# Patient Record
Sex: FEMALE | Race: WHITE | NOT HISPANIC OR LATINO
[De-identification: names, ages, dates, MRNs, and addresses within clinical notes are randomized per-mention and may not be internally consistent; named-entity substitution may affect disease eponyms.]

---

## 2019-12-27 ENCOUNTER — RESULT REVIEW (OUTPATIENT)
Age: 64
End: 2019-12-27

## 2021-10-06 ENCOUNTER — RESULT REVIEW (OUTPATIENT)
Age: 66
End: 2021-10-06

## 2023-06-14 ENCOUNTER — NON-APPOINTMENT (OUTPATIENT)
Age: 68
End: 2023-06-14

## 2023-06-15 ENCOUNTER — TRANSCRIPTION ENCOUNTER (OUTPATIENT)
Age: 68
End: 2023-06-15

## 2023-06-15 PROBLEM — Z00.00 ENCOUNTER FOR PREVENTIVE HEALTH EXAMINATION: Status: ACTIVE | Noted: 2023-06-15

## 2023-06-20 ENCOUNTER — APPOINTMENT (OUTPATIENT)
Dept: SURGERY | Facility: CLINIC | Age: 68
End: 2023-06-20
Payer: MEDICARE

## 2023-06-20 VITALS
SYSTOLIC BLOOD PRESSURE: 116 MMHG | HEIGHT: 65 IN | WEIGHT: 203 LBS | DIASTOLIC BLOOD PRESSURE: 72 MMHG | BODY MASS INDEX: 33.82 KG/M2

## 2023-06-20 DIAGNOSIS — Z78.9 OTHER SPECIFIED HEALTH STATUS: ICD-10-CM

## 2023-06-20 PROCEDURE — 99205 OFFICE O/P NEW HI 60 MIN: CPT

## 2023-06-20 NOTE — HISTORY OF PRESENT ILLNESS
Have You Had Botox Before?: has had botox
When Was Your Last Botox Treatment?: 08/2020
[FreeTextEntry1] : Oncology History:\par 1. 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 Patient went for screening mammogram on 06/06/23 and was found to have a new 1.7 cm spiculated mass. Stereotactic biopsy was preformed on 06/07/23 that came back as invasive carcinoma with mixed ductal and lobular features, grade 1, 7mm, ER (%), [place] (21-30%), Her 2 negative and ductal carcinoma in situ with associated calcifications intermediate grade. \par \par BREAST HISTORY: Denies breast symptoms. Last mammogram prior to current was in 2019. This was her first breast biopsy.\par \par NKDA, no latex allergy\par No AC or aspirin\par PMhx-HTN, HL, pre-diabetic \par \par \par Imaging: \par 06/06/23: [facility]: Screening Mammogram Bilateral: Density B, Impression- In the left breasts 3:00 axis posterior depth there is a new 1.7 cm low density spiculated mass. Features highly suggestive of primary breast cancer. No additional suspicious mammographic findings has developed in either breast. A few small scattered intramammary lymph nodes and benign calcifications bilaterally. No abnormal axillary adenopthy. BI-RADS0. \par \par 6/7/23-[facility]: Left limited US: Lesion not clearly identified. \par \par 06/07/23: [facility]: Stereotactic Core Biopsy Left Breast: Top hat clip: Impression- Invasive carcinoma with mixed ductal and lobular features, grade 1, 7mm, ductal carcinoma in situ associated calcifications present \par \par 6/15/23-[facility]- MRI Breast: Tophat associated susceptibility artifact is present in the slightly outer central left breast at posterior depth denoting the site of biopsy-proven malignancy (2/80). There is heterogeneous surrounding the mass enhancement measuring 1.7 x 1.6 x 1.2 cm (101/68). Abnormal enhancement extends up to 2.2 cm from the pectoralis major muscle and 1.2 cm from the lateral skin surface (7/68). The non-mass enhancement predominantly demonstrates medium initial and persistent enhancement kinetics. Mild\par enhancement along the biopsy tract extending to the lateral skin surface is likely postprocedural in nature.\par \par An intramammary lymph node is present in the outer central left breast at mid depth and mammographically stable dating back to 2019 (7/70). There is no left axillary lymphadenopathy.Right breast: An intramammary lymph node is present in the outer central right breast is mammographically stable dating back to 2019 (7/48). There is no suspicious enhancement. There is no right axillary lymphadenopathy.

## 2023-06-20 NOTE — PAST MEDICAL HISTORY
[Postmenopausal] : The patient is postmenopausal [Menarche Age ____] : age at menarche was [unfilled] [Total Preg ___] : G[unfilled] [Age At Live Birth ___] : Age at live birth: [unfilled] [de-identified] : 63 [FreeTextEntry6] : Denies [FreeTextEntry8] : 3 months  [FreeTextEntry7] : 6 months

## 2023-06-20 NOTE — PHYSICAL EXAM
[Normocephalic] : normocephalic [Supple] : supple [No Axillary Lymphadenopathy] : no left axillary lymphadenopathy [No Edema] : no edema [Atraumatic] : atraumatic [EOMI] : extra ocular movement intact [PERRL] : pupils equal, round and reactive to light [Sclera nonicteric] : sclera nonicteric [No Supraclavicular Adenopathy] : no supraclavicular adenopathy [No Cervical Adenopathy] : no cervical adenopathy [Examined in the supine and seated position] : examined in the supine and seated position [Symmetrical] : symmetrical [Bra Size: ___] : Bra Size: [unfilled] [Grade 2] : Ptosis Grade 2 [No dominant masses] : no dominant masses in right breast  [No dominant masses] : no dominant masses left breast [No Nipple Retraction] : no left nipple retraction [No Nipple Discharge] : no left nipple discharge [No Rashes] : no rashes [No Ulceration] : no ulceration [de-identified] : Post-biopsy changes

## 2023-06-20 NOTE — ASSESSMENT
[FreeTextEntry1] : 68 year old female with a newly diagnosed left breast cT1cN0 invasive carcinoma with mixed ductal and lobular features, grade 1, ER/SC +, HER2 negative.\par \par We discussed the patient’s new diagnosis of IDC and the pathophysiology of the disease process. We reviewed her work-up, imaging and pathology results to date including her hormone receptor status. We discussed the difference between systemic and local treatment and options for each type of control. We discussed the role of a breast surgeon, medical oncologist, and radiation oncologist in the patients’ treatment plan. We discussed the indications of surgery, anti-estrogen therapy, chemotherapy, radiation therapy.   \par \par We reviewed the surgical options of mastectomy versus breast conservation therapy (BCT).  We discussed the equivalent survival outcomes for patients treated with lumpectomy/radiation or mastectomy. As well as reconstructive options including no reconstruction (flat closure), implant-based, and autologous. She would be a BCT candidate and she favors this option. \par \par We discussed the need for sentinel lymph node biopsy, with intraoperative injection for sentinel lymph node mapping. Associated risks of lymphedema were also discussed, as well as lymphedema surveillance/prevention, and the use of SOZO. Will send patient for baseline SOZO at [facility], and she will follow with  PMR Dr. Nieves after surgery for lymphedema surveillance. \par \par We discussed the same day nature of the procedure, post-operative expectations/healing, need for localization prior to surgery, and PST/medical clearance.I will go over the final pathology results with her at her post-op visit 7-14 days later.\par \par Risks of the procedure were discussed including bleeding, infection, seroma, need for re-excision or completion axillary dissection, lymphedema, scarring, pain, asymmetry, or cosmetic deformity.\par \par Patient has a 5 week camping trip planned in August and is interested in discussing her options to postpone surgery until after her trip. We discussed the 60-day window for surgery vs neoadjuvant endocrine therapy, as well as pros and cons (including possibility of ALND in the setting of + LN). She is meeting w/Dr. De Leon today for consultation. \par \par Patient verbalized understanding for all treatment plans discussed. All of her questions were answered to the best of my ability. She was encouraged to call the office if any questions or concerns or come in sooner if needed.\par \par \par \par Plan:\par 1. Baseline SOZO\par 2. Left breast magseed localized partial mastectomy, SLNB\par 3. PST/med clearance\par 4. Followup post-op\par 5. Med onc consultation \par

## 2023-06-23 ENCOUNTER — RESULT REVIEW (OUTPATIENT)
Age: 68
End: 2023-06-23

## 2023-06-23 ENCOUNTER — APPOINTMENT (OUTPATIENT)
Dept: SURGERY | Facility: AMBULATORY MEDICAL SERVICES | Age: 68
End: 2023-06-23
Payer: MEDICARE

## 2023-06-23 PROCEDURE — 38900 IO MAP OF SENT LYMPH NODE: CPT | Mod: LT

## 2023-06-23 PROCEDURE — 19301 PARTIAL MASTECTOMY: CPT | Mod: LT

## 2023-06-23 PROCEDURE — 38525 BIOPSY/REMOVAL LYMPH NODES: CPT | Mod: LT

## 2023-06-23 PROCEDURE — 38792 RA TRACER ID OF SENTINL NODE: CPT | Mod: LT,59

## 2023-06-29 ENCOUNTER — APPOINTMENT (OUTPATIENT)
Dept: SURGERY | Facility: CLINIC | Age: 68
End: 2023-06-29
Payer: COMMERCIAL

## 2023-07-18 ENCOUNTER — APPOINTMENT (OUTPATIENT)
Dept: SURGERY | Facility: CLINIC | Age: 68
End: 2023-07-18
Payer: COMMERCIAL

## 2023-07-18 VITALS
HEIGHT: 65 IN | WEIGHT: 203 LBS | BODY MASS INDEX: 33.82 KG/M2 | DIASTOLIC BLOOD PRESSURE: 76 MMHG | SYSTOLIC BLOOD PRESSURE: 136 MMHG

## 2023-07-18 PROCEDURE — 99024 POSTOP FOLLOW-UP VISIT: CPT

## 2023-07-18 NOTE — ASSESSMENT
[FreeTextEntry1] : 68 year old female with left breast [id number] invasive carcinoma with mixed ductal and lobular features, grade 1, ER/CT +, Her 2 negative w/DCIS. Underwent Left breast magseed partial mastectomy with SLNB on 06/23/23. Final tumor size is 7mm, DCIS 15mm, 1/2 LN + for mets. \par \par We reviewed post-op expectations and recovery. I explained to her that she is healing well without concern. We reviewed her pathology in detail and I explained to her that we are complete from a surgical standpoint and she can proceed with radiation and systemic therapy. We will obtain her next baseline mammogram 6 months after completion of radiation. She sees Dr. Calles tomorrow, and was encouraged to make a followup appt with Dr. De Leon. I will see her back in 6 months for her next CBE. \par \par I will also refer her on to Dr. Nieves for lymphedema surveillance and SOZO measurements. \par \par Patient verbalized understanding for all treatment plans discussed. All of her questions were answered to the best of my ability. She was encouraged to call the office if any questions or concerns or come in sooner if needed.\par \par \par Plan:\par 1. Followup med onc\par 2. Rad onc consultation \par 3. PMR/lymphedema referral\par 4. Followup in 6 months

## 2023-07-18 NOTE — PHYSICAL EXAM
[Normocephalic] : normocephalic [Atraumatic] : atraumatic [EOMI] : extra ocular movement intact [PERRL] : pupils equal, round and reactive to light [Sclera nonicteric] : sclera nonicteric [Supple] : supple [No Supraclavicular Adenopathy] : no supraclavicular adenopathy [No Cervical Adenopathy] : no cervical adenopathy [Examined in the supine and seated position] : examined in the supine and seated position [Symmetrical] : symmetrical [Bra Size: ___] : Bra Size: [unfilled] [Grade 2] : Ptosis Grade 2 [No dominant masses] : no dominant masses in right breast  [No dominant masses] : no dominant masses left breast [No Nipple Retraction] : no left nipple retraction [No Nipple Discharge] : no left nipple discharge [No Axillary Lymphadenopathy] : no left axillary lymphadenopathy [No Edema] : no edema [No Rashes] : no rashes [No Ulceration] : no ulceration [de-identified] : Celina-areolar incision well approximated. Mild swelling. No erythema or drainage.  [de-identified] : Axillary incision is well-approximated.

## 2023-07-18 NOTE — HISTORY OF PRESENT ILLNESS
[FreeTextEntry1] : Oncology History:\par 1. 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 She has no complaints at this time. \par \par \par HPI: Patient is a 68 year old female presenting for initial consultation on 6/19/2023. Patient went for screening mammogram on 06/06/23 and was found to have a new 1.7 cm spiculated mass. Stereotactic biopsy was preformed on 06/07/23 that came back as invasive carcinoma with mixed ductal and lobular features, grade 1, 7mm, ER (%), [place] (21-30%), Her 2 negative and ductal carcinoma in situ with associated calcifications intermediate grade. \par \par BREAST HISTORY: Denies breast symptoms. Last mammogram prior to current was in 2019. This was her first breast biopsy.\par \par NKDA, no latex allergy\par No AC or aspirin\par PMhx-HTN, HL, pre-diabetic \par \par \par Imaging: \par 06/06/23: [facility]: Screening Mammogram Bilateral: Density B, Impression- In the left breasts 3:00 axis posterior depth there is a new 1.7 cm low density spiculated mass. Features highly suggestive of primary breast cancer. No additional suspicious mammographic findings has developed in either breast. A few small scattered intramammary lymph nodes and benign calcifications bilaterally. No abnormal axillary adenopthy. BI-RADS0. \par \par 6/7/23-[facility]: Left limited US: Lesion not clearly identified. \par \par 06/07/23: [facility]: Stereotactic Core Biopsy Left Breast: Top hat clip: Impression- Invasive carcinoma with mixed ductal and lobular features, grade 1, 7mm, ductal carcinoma in situ associated calcifications present \par \par 6/15/23-[facility]- MRI Breast: Tophat associated susceptibility artifact is present in the slightly outer central left breast at posterior depth denoting the site of biopsy-proven malignancy (2/80). There is heterogeneous surrounding the mass enhancement measuring 1.7 x 1.6 x 1.2 cm (101/68). Abnormal enhancement extends up to 2.2 cm from the pectoralis major muscle and 1.2 cm from the lateral skin surface (7/68). The non-mass enhancement predominantly demonstrates medium initial and persistent enhancement kinetics. Mild\par enhancement along the biopsy tract extending to the lateral skin surface is likely postprocedural in nature.\par \par An intramammary lymph node is present in the outer central left breast at mid depth and mammographically stable dating back to 2019 (7/70). There is no left axillary lymphadenopathy.Right breast: An intramammary lymph node is present in the outer central right breast is mammographically stable dating back to 2019 (7/48). There is no suspicious enhancement. There is no right axillary lymphadenopathy.

## 2023-07-18 NOTE — PAST MEDICAL HISTORY
[Postmenopausal] : The patient is postmenopausal [Menarche Age ____] : age at menarche was [unfilled] [Total Preg ___] : G[unfilled] [Age At Live Birth ___] : Age at live birth: [unfilled] [de-identified] : 63 [FreeTextEntry6] : Denies [FreeTextEntry7] : 6 months  [FreeTextEntry8] : 3 months

## 2023-08-01 ENCOUNTER — APPOINTMENT (OUTPATIENT)
Dept: PHYSICAL MEDICINE AND REHAB | Facility: CLINIC | Age: 68
End: 2023-08-01
Payer: MEDICARE

## 2023-08-01 VITALS — HEIGHT: 66 IN | WEIGHT: 203 LBS | BODY MASS INDEX: 32.62 KG/M2

## 2023-08-01 DIAGNOSIS — Z78.9 OTHER SPECIFIED HEALTH STATUS: ICD-10-CM

## 2023-08-01 DIAGNOSIS — Z85.3 PERSONAL HISTORY OF MALIGNANT NEOPLASM OF BREAST: ICD-10-CM

## 2023-08-01 DIAGNOSIS — Z86.79 PERSONAL HISTORY OF OTHER DISEASES OF THE CIRCULATORY SYSTEM: ICD-10-CM

## 2023-08-01 DIAGNOSIS — Z87.39 PERSONAL HISTORY OF OTHER DISEASES OF THE MUSCULOSKELETAL SYSTEM AND CONNECTIVE TISSUE: ICD-10-CM

## 2023-08-01 DIAGNOSIS — R73.03 PREDIABETES.: ICD-10-CM

## 2023-08-01 DIAGNOSIS — Z86.39 PERSONAL HISTORY OF OTHER ENDOCRINE, NUTRITIONAL AND METABOLIC DISEASE: ICD-10-CM

## 2023-08-01 PROCEDURE — 99203 OFFICE O/P NEW LOW 30 MIN: CPT | Mod: 25

## 2023-08-01 PROCEDURE — 93702 BIS XTRACELL FLUID ANALYSIS: CPT

## 2023-08-01 NOTE — ASSESSMENT
[FreeTextEntry1] : 68 year old female presenting for evaluation.  #Breast Cancer #At risk for lymphedema: -Breast surgery notes reviewed, s/p SLNB +1/2 nodes -Educated on home exercise program  -No clinical signs of lymphedema on exam -Lymphedema education provided to patient -Bioimpedance spectroscopy performed today with L-dex appropriate. -Next surveillance in October 2023  Follow up in 2 months.    The patient had a follow-up SOZO measurement which I reviewed today. Bioimpedance spectroscopy helps identify the onset of lymphedema in an arm or leg before patients experience noticeable swelling. Research has shown that the early detection of lymphedema using L-Dex combined with treatment can reduce progression to chronic lymphedema by 95% in breast cancer patients. Whenever possible, patients are tested for baseline L-Dex score before cancer treatment begins and then are reassessed during regular follow-up visits using the SOZO device. Otherwise, this can be started postoperatively and continued during regular follow-up visits. If the patients L-Dex score increases above normal levels, that is a sign that lymphedema is developing, and a referral is made to physical therapy for further evaluation and/or early compression treatment. Lymphedema assessment with the SOZO L-Dex score is recommended to be done every 3 months for the first 3 years and then every 6 months for years 4 and 5, followed by annually afterwards.

## 2023-08-01 NOTE — HISTORY OF PRESENT ILLNESS
[FreeTextEntry1] : Ms. Carter is a 68 year old female with left breast [id number] invasive carcinoma with mixed ductal and lobular features, grade 1, ER/MA +, Her 2 negative w/DCIS. Underwent Left breast magseed partial mastectomy with SLNB on 06/23/23. Final tumor size is 7mm, DCIS 15mm, 1/2 LN + for mets.  Pending radiation therapy.  She denies any pain.  Denies any difficulty with range of motion.  Denies any swelling or heaviness in her upper extremities.

## 2023-10-03 ENCOUNTER — APPOINTMENT (OUTPATIENT)
Dept: PHYSICAL MEDICINE AND REHAB | Facility: CLINIC | Age: 68
End: 2023-10-03
Payer: MEDICARE

## 2023-10-03 DIAGNOSIS — R53.83 OTHER FATIGUE: ICD-10-CM

## 2023-10-03 PROCEDURE — 93702 BIS XTRACELL FLUID ANALYSIS: CPT

## 2023-10-03 PROCEDURE — 99213 OFFICE O/P EST LOW 20 MIN: CPT | Mod: 25

## 2024-01-02 ENCOUNTER — APPOINTMENT (OUTPATIENT)
Dept: PHYSICAL MEDICINE AND REHAB | Facility: CLINIC | Age: 69
End: 2024-01-02
Payer: MEDICARE

## 2024-01-02 VITALS
RESPIRATION RATE: 14 BRPM | HEART RATE: 86 BPM | DIASTOLIC BLOOD PRESSURE: 88 MMHG | WEIGHT: 206 LBS | HEIGHT: 66 IN | SYSTOLIC BLOOD PRESSURE: 124 MMHG | BODY MASS INDEX: 33.11 KG/M2

## 2024-01-02 PROCEDURE — 99214 OFFICE O/P EST MOD 30 MIN: CPT | Mod: 25

## 2024-01-02 PROCEDURE — 93702 BIS XTRACELL FLUID ANALYSIS: CPT

## 2024-01-02 NOTE — ASSESSMENT
[FreeTextEntry1] : 68 year old female presenting for evaluation.  #Neuropathic pain/Joint pain: -She is presenting with dysesthesias in b/l chest wall -On left side could be post-Radiation therapy related, unclear why on right side, could possibly be arthralgias due to aromatase inhibitor as this has been exacerbated as well -No signs of myelopathy today, educated patient on red flag signs for which to report to ER -Discussed risks and benefits of duloxetine to assist with pain, start duloxetine 30mg daily -Discussed if no improvement or worsens will consider MRI thoracic spine to evaluate for radiculopathy, she will call me with updates in 1-2 weeks -Continue to follow with oncology and breast surgery    #At risk for lymphedema: -No clinical signs of lymphedema on exam -Lymphedema education provided to patient -Bioimpedance spectroscopy performed today due to symptoms with L-dex appropriate. -Next surveillance April 2024   Close follow up in 1 month.  The patient had a follow-up SOZO measurement which I reviewed today.  Bioimpedance spectroscopy helps identify the onset of lymphedema in an arm or leg before patients experience noticeable swelling. Research has shown that the early detection of lymphedema using L-Dex combined with treatment can reduce progression to chronic lymphedema by 95% in breast cancer patients. Whenever possible, patients are tested for baseline L-Dex score before cancer treatment begins and then are reassessed during regular follow-up visits using the SOZO device. Otherwise, this can be started postoperatively and continued during regular follow-up visits. If the patients L-Dex score increases above normal levels, that is a sign that lymphedema is developing, and a referral is made to physical therapy for further evaluation and/or early compression treatment. Lymphedema assessment with the SOZO L-Dex score is recommended to be done every 3 months for the first 3 years and then every 6 months for years 4 and 5, followed by annually afterwards.

## 2024-01-02 NOTE — REVIEW OF SYSTEMS
[Negative] : Heme/Lymph [FreeTextEntry9] : +Chest wall pain [de-identified] : +Burning in chest wall

## 2024-01-02 NOTE — PHYSICAL EXAM
[FreeTextEntry1] : Gen: Patient is A&O x 3, NAD HEENT: EOMI, hearing grossly normal Resp: regular, non - labored CV: pulses regular Skin: no rashes, erythema Lymph: no clubbing, cyanosis, edema Inspection: no instability  ROM: full throughout Palpation:No TTP in spinous processes or to chest wall  Sensation: intact to light touch, +Dysesthesia in b/l chest wall  Reflexes: 1+ and symmetric throughout Strength: 5/5 throughout Special tests: -Hoyt sign, -Spurling sign, -Hoffmans sign  Gait: normal, non-antalgic  Bioimpedance spectroscopy performed today with L-Dex 6.6 LUE (5.2 pre-operative baseline).

## 2024-01-02 NOTE — HISTORY OF PRESENT ILLNESS
[FreeTextEntry1] : Ms. Carter is a 68 year old female with left breast [id number] invasive carcinoma with mixed ductal and lobular features, grade 1, ER/AK +, Her 2 negative w/DCIS. Underwent Left breast magseed partial mastectomy with SLNB on 06/23/23. Final tumor size is 7mm, DCIS 15mm, 1/2 LN + for mets.  Completed RT.  On letrozole.  She reports 10 days ago she stopped claritin.  Noticed pain along her chest region and joints.  Worst in the left, but also in the right.  No edema in UE.  No bowel/bladder dysfunction.  No weakness, no new numbness.  She reports it is getting better over the past few days.  Denies any neck pain or radiation of pain from her neck.

## 2024-01-08 ENCOUNTER — NON-APPOINTMENT (OUTPATIENT)
Age: 69
End: 2024-01-08

## 2024-01-23 ENCOUNTER — APPOINTMENT (OUTPATIENT)
Dept: SURGERY | Facility: CLINIC | Age: 69
End: 2024-01-23
Payer: MEDICARE

## 2024-01-23 VITALS
SYSTOLIC BLOOD PRESSURE: 124 MMHG | HEIGHT: 66 IN | DIASTOLIC BLOOD PRESSURE: 88 MMHG | WEIGHT: 205 LBS | BODY MASS INDEX: 32.95 KG/M2

## 2024-01-23 DIAGNOSIS — C50.912 MALIGNANT NEOPLASM OF UNSPECIFIED SITE OF LEFT FEMALE BREAST: ICD-10-CM

## 2024-01-23 DIAGNOSIS — Z17.0 MALIGNANT NEOPLASM OF UNSPECIFIED SITE OF LEFT FEMALE BREAST: ICD-10-CM

## 2024-01-23 PROCEDURE — 99213 OFFICE O/P EST LOW 20 MIN: CPT

## 2024-01-23 NOTE — PHYSICAL EXAM
[Normocephalic] : normocephalic [Atraumatic] : atraumatic [EOMI] : extra ocular movement intact [PERRL] : pupils equal, round and reactive to light [Sclera nonicteric] : sclera nonicteric [Supple] : supple [No Supraclavicular Adenopathy] : no supraclavicular adenopathy [No Cervical Adenopathy] : no cervical adenopathy [Symmetrical] : symmetrical [Examined in the supine and seated position] : examined in the supine and seated position [Bra Size: ___] : Bra Size: [unfilled] [Grade 2] : Ptosis Grade 2 [No dominant masses] : no dominant masses in right breast  [No dominant masses] : no dominant masses left breast [No Nipple Retraction] : no left nipple retraction [No Nipple Discharge] : no left nipple discharge [No Axillary Lymphadenopathy] : no left axillary lymphadenopathy [No Edema] : no edema [No Rashes] : no rashes [No Ulceration] : no ulceration [de-identified] : Celina-areolar incision well-healed. Mild post-radiation changes.   [de-identified] : Axillary incision is well-approximated.

## 2024-01-23 NOTE — PAST MEDICAL HISTORY
[Postmenopausal] : The patient is postmenopausal [Menarche Age ____] : age at menarche was [unfilled] [Total Preg ___] : G[unfilled] [Age At Live Birth ___] : Age at live birth: [unfilled] [de-identified] : 63 [FreeTextEntry6] : Denies [FreeTextEntry7] : 6 months  [FreeTextEntry8] : 3 months

## 2024-01-23 NOTE — HISTORY OF PRESENT ILLNESS
[FreeTextEntry1] : Oncology History: 1. 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 She has no complaints at this time.   (24): Patient presents for 6 month follow up.  Patient has been following with Dr. Nieves for joint pain/neuropathic pain with improvement since starting Duloxetine. Completed radiation, on letrozole tolerating well besides joint pain. Will be going away from May -July this year. Denies breast complaints.    HPI: Patient is a 68 year old female presenting for initial consultation on 2023. Patient went for screening mammogram on 23 and was found to have a new 1.7 cm spiculated mass. Stereotactic biopsy was preformed on 23 that came back as invasive carcinoma with mixed ductal and lobular features, grade 1, 7mm, ER (%), [place] (21-30%), Her 2 negative and ductal carcinoma in situ with associated calcifications intermediate grade.   BREAST HISTORY: Denies breast symptoms. Last mammogram prior to current was in 2019. This was her first breast biopsy.  NKDA, no latex allergy No AC or aspirin PMhx-HTN, HL, pre-diabetic    Imagin23: [facility]: Screening Mammogram Bilateral: Density B, Impression- In the left breasts 3:00 axis posterior depth there is a new 1.7 cm low density spiculated mass. Features highly suggestive of primary breast cancer. No additional suspicious mammographic findings has developed in either breast. A few small scattered intramammary lymph nodes and benign calcifications bilaterally. No abnormal axillary adenopthy. BI-RADS0.   6/-[facility]: Left limited US: Lesion not clearly identified.   23: [facility]: Stereotactic Core Biopsy Left Breast: Top hat clip: Impression- Invasive carcinoma with mixed ductal and lobular features, grade 1, 7mm, ductal carcinoma in situ associated calcifications present   6/15/23-[facility]- MRI Breast: Tophat associated susceptibility artifact is present in the slightly outer central left breast at posterior depth denoting the site of biopsy-proven malignancy (). There is heterogeneous surrounding the mass enhancement measuring 1.7 x 1.6 x 1.2 cm (/). Abnormal enhancement extends up to 2.2 cm from the pectoralis major muscle and 1.2 cm from the lateral skin surface (). The non-mass enhancement predominantly demonstrates medium initial and persistent enhancement kinetics. Mild enhancement along the biopsy tract extending to the lateral skin surface is likely postprocedural in nature.  An intramammary lymph node is present in the outer central left breast at mid depth and mammographically stable dating back to  (). There is no left axillary lymphadenopathy.Right breast: An intramammary lymph node is present in the outer central right breast is mammographically stable dating back to 2019 (). There is no suspicious enhancement. There is no right axillary lymphadenopathy.

## 2024-01-23 NOTE — ASSESSMENT
[FreeTextEntry1] : 68 year old female with left breast [id number] invasive carcinonma with mixed ductal and lobular features, grade 1, ER/NV +, Her 2 negative with DCIS. Underwent left breast magseed partial mastectomy with SLNB on 06/23/23. Final tumor size is 7 mm, DCIS 15 mm, 1/2 LN + for mets.   CBE is benign. Will obtain new baseline mammogram/US at the end of April, as patient will be away May - July. Next CBE in six months.   Follow up with medical oncology and radiation oncology. Follow up with Dr Nieves, will get next SOZO measurement there.   Patient verbalized understanding of all treatment plans. All of her questions were answered to the best of my ability. She was encouraged to call the office sooner for any questions or concerns.  Plan: 1. New baseline mammogram/US April '24 2. Followup in 6 months 3. Followup med onc, rad onc 4. Followup PMR for next SOZO

## 2024-02-06 ENCOUNTER — APPOINTMENT (OUTPATIENT)
Dept: PHYSICAL MEDICINE AND REHAB | Facility: CLINIC | Age: 69
End: 2024-02-06

## 2024-02-26 ENCOUNTER — NON-APPOINTMENT (OUTPATIENT)
Age: 69
End: 2024-02-26

## 2024-04-02 ENCOUNTER — APPOINTMENT (OUTPATIENT)
Dept: PHYSICAL MEDICINE AND REHAB | Facility: CLINIC | Age: 69
End: 2024-04-02
Payer: MEDICARE

## 2024-04-02 DIAGNOSIS — T45.1X5A PAIN IN UNSPECIFIED JOINT: ICD-10-CM

## 2024-04-02 DIAGNOSIS — M79.2 NEURALGIA AND NEURITIS, UNSPECIFIED: ICD-10-CM

## 2024-04-02 DIAGNOSIS — Z91.89 OTHER SPECIFIED PERSONAL RISK FACTORS, NOT ELSEWHERE CLASSIFIED: ICD-10-CM

## 2024-04-02 DIAGNOSIS — M25.50 PAIN IN UNSPECIFIED JOINT: ICD-10-CM

## 2024-04-02 PROCEDURE — 93702 BIS XTRACELL FLUID ANALYSIS: CPT

## 2024-04-02 PROCEDURE — 99214 OFFICE O/P EST MOD 30 MIN: CPT | Mod: 25

## 2024-04-02 RX ORDER — DULOXETINE HYDROCHLORIDE 30 MG/1
30 CAPSULE, DELAYED RELEASE PELLETS ORAL
Qty: 90 | Refills: 0 | Status: ACTIVE | COMMUNITY
Start: 2024-01-02 | End: 1900-01-01

## 2024-04-02 NOTE — REVIEW OF SYSTEMS
[Negative] : Heme/Lymph [de-identified] : +Burning in chest wall [FreeTextEntry9] : +Chest wall pain

## 2024-04-02 NOTE — ASSESSMENT
[FreeTextEntry1] : 68 year old female presenting for evaluation.  #Neuropathic pain/Joint pain: -Chest wall dysesthesia improved, mild joint pain, possible secondary to AI -Continue duloxetine 30mg daily-rx sent  -Continue to follow with oncology and breast surgery    #At risk for lymphedema: -No clinical signs of lymphedema on exam -Lymphedema education provided to patient -Denies any electronic implantable devices  -Bioimpedance spectroscopy performed today with L-dex appropriate  -Next surveillance in July 2024   Follow up in 3 months.    The patient had a follow-up SOZO measurement which I reviewed today.  Bioimpedance spectroscopy helps identify the onset of lymphedema in an arm or leg before patients experience noticeable swelling. Research has shown that the early detection of lymphedema using L-Dex combined with treatment can reduce progression to chronic lymphedema by 95% in breast cancer patients. Whenever possible, patients are tested for baseline L-Dex score before cancer treatment begins and then are reassessed during regular follow-up visits using the SOZO device. Otherwise, this can be started postoperatively and continued during regular follow-up visits. If the patients L-Dex score increases above normal levels, that is a sign that lymphedema is developing, and a referral is made to physical therapy for further evaluation and/or early compression treatment. Lymphedema assessment with the SOZO L-Dex score is recommended to be done every 3 months for the first 3 years and then every 6 months for years 4 and 5, followed by annually afterwards.

## 2024-04-02 NOTE — PHYSICAL EXAM
[FreeTextEntry1] : Gen: Patient is A&O x 3, NAD HEENT: EOMI, hearing grossly normal Resp: regular, non - labored CV: pulses regular Skin: no rashes, erythema Lymph: no clubbing, cyanosis, edema Inspection: no instability  ROM: full throughout Palpation:No TTP in chest wall  Sensation: intact to light touch, No dysesthesia  Reflexes: 1+ and symmetric throughout Strength: 5/5 throughout Special tests: -Hoyt sign, -Spurling sign, -Hoffmans sign  Gait: normal, non-antalgic  Bioimpedance spectroscopy performed today with L-Dex 3.2 LUE (5.2 pre-operative baseline).

## 2024-04-02 NOTE — HISTORY OF PRESENT ILLNESS
[FreeTextEntry1] : Ms. 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 Underwent Left breast magseed partial mastectomy with SLNB on 06/23/23. Final tumor size is 7mm, DCIS 15mm, 1/2 LN + for mets.  Completed RT.  On letrozole.  She reports chest wall pain has completely resolved.  Reports duloxetine helped significantly.  No new weakness or numbness.  Also helping with hip pain.  No side effects.  No edema in UE.

## 2024-05-06 ENCOUNTER — NON-APPOINTMENT (OUTPATIENT)
Age: 69
End: 2024-05-06

## 2024-05-07 ENCOUNTER — RESULT REVIEW (OUTPATIENT)
Age: 69
End: 2024-05-07

## 2024-07-09 ENCOUNTER — APPOINTMENT (OUTPATIENT)
Dept: SURGERY | Facility: CLINIC | Age: 69
End: 2024-07-09
Payer: MEDICARE

## 2024-07-09 VITALS
BODY MASS INDEX: 31.82 KG/M2 | DIASTOLIC BLOOD PRESSURE: 82 MMHG | HEIGHT: 66 IN | SYSTOLIC BLOOD PRESSURE: 117 MMHG | WEIGHT: 198 LBS

## 2024-07-09 DIAGNOSIS — C50.912 MALIGNANT NEOPLASM OF UNSPECIFIED SITE OF LEFT FEMALE BREAST: ICD-10-CM

## 2024-07-09 DIAGNOSIS — Z17.0 MALIGNANT NEOPLASM OF UNSPECIFIED SITE OF LEFT FEMALE BREAST: ICD-10-CM

## 2024-07-09 PROCEDURE — 99213 OFFICE O/P EST LOW 20 MIN: CPT

## 2024-07-24 ENCOUNTER — APPOINTMENT (OUTPATIENT)
Dept: PHYSICAL MEDICINE AND REHAB | Facility: CLINIC | Age: 69
End: 2024-07-24
Payer: MEDICARE

## 2024-07-24 VITALS
DIASTOLIC BLOOD PRESSURE: 80 MMHG | BODY MASS INDEX: 31.82 KG/M2 | SYSTOLIC BLOOD PRESSURE: 100 MMHG | HEIGHT: 66 IN | WEIGHT: 198 LBS

## 2024-07-24 DIAGNOSIS — Z91.89 OTHER SPECIFIED PERSONAL RISK FACTORS, NOT ELSEWHERE CLASSIFIED: ICD-10-CM

## 2024-07-24 DIAGNOSIS — M79.2 NEURALGIA AND NEURITIS, UNSPECIFIED: ICD-10-CM

## 2024-07-24 PROCEDURE — 99214 OFFICE O/P EST MOD 30 MIN: CPT

## 2024-07-24 NOTE — HISTORY OF PRESENT ILLNESS
[FreeTextEntry1] : Ms. Carter is a 69 year old female with left breast [id number] invasive carcinoma with mixed ductal and lobular features, grade 1, ER/FL +, Her 2 negative w/DCIS. Underwent Left breast magseed partial mastectomy with SLNB on 06/23/23. Final tumor size is 7mm, DCIS 15mm, 1/2 LN + for mets.  Completed RT.  On letrozole.  She reports that she is doing well since last visit.  Reports that her pain has completely improved.  She tried to decrease the duloxetine but noticed that her pain is increased so she restarted.  Currently on duloxetine she is not having any joint pain or chest wall pain.  Reports some numbness in her bilateral hands digits 1-3 but denies any focal weakness.  No radiation pain from neck.  No bowel bladder dysfunction.  Denies any swelling or heaviness in upper extremity.

## 2024-07-24 NOTE — PHYSICAL EXAM
[FreeTextEntry1] : Gen: Patient is A&O x 3, NAD HEENT: EOMI, hearing grossly normal Resp: regular, non - labored CV: pulses regular Skin: no rashes, erythema Lymph: no clubbing, cyanosis, edema Inspection: no instability  ROM: full throughout Palpation:No TTP in chest wall  Sensation: intact to light touch, Decreased in b/l fingers 1-3  Reflexes: 1+ and symmetric throughout Strength: 5/5 throughout Special tests: -Hoyt sign, -Spurling sign, -Hoffmans sign  Gait: normal, non-antalgic

## 2024-07-24 NOTE — REVIEW OF SYSTEMS
[Negative] : Heme/Lymph [de-identified] : +Burning in chest wall [FreeTextEntry9] : +Chest wall pain

## 2024-07-24 NOTE — ASSESSMENT
[FreeTextEntry1] : 68 year old female presenting for evaluation.  #Neuropathic pain/Joint pain: -Chest wall dysesthesia improved, mild joint pain, possible secondary to AI -Continue duloxetine 30mg daily-rx sent   #Post-mastectomy pain: -Likely fibrosis and neuropathic pain in left breast -Duloxetine -If no improvement consider PT   #B/L Carpal Tunnel: -No atrophy or weakness -Start neutral wrist splints    #At risk for lymphedema: -No clinical signs of lymphedema on exam -Lymphedema education provided to patient -Denies any electronic implantable devices  -Next surveillance in October 2024   Follow up in 3 months.

## 2025-01-16 ENCOUNTER — NON-APPOINTMENT (OUTPATIENT)
Age: 70
End: 2025-01-16

## 2025-01-21 ENCOUNTER — APPOINTMENT (OUTPATIENT)
Facility: CLINIC | Age: 70
End: 2025-01-21
Payer: MEDICARE

## 2025-01-21 VITALS
HEIGHT: 66 IN | OXYGEN SATURATION: 99 % | WEIGHT: 199 LBS | SYSTOLIC BLOOD PRESSURE: 124 MMHG | RESPIRATION RATE: 16 BRPM | DIASTOLIC BLOOD PRESSURE: 84 MMHG | HEART RATE: 88 BPM | BODY MASS INDEX: 31.98 KG/M2

## 2025-01-21 DIAGNOSIS — Z17.0 MALIGNANT NEOPLASM OF UNSPECIFIED SITE OF LEFT FEMALE BREAST: ICD-10-CM

## 2025-01-21 DIAGNOSIS — C50.912 MALIGNANT NEOPLASM OF UNSPECIFIED SITE OF LEFT FEMALE BREAST: ICD-10-CM

## 2025-01-21 PROCEDURE — 99213 OFFICE O/P EST LOW 20 MIN: CPT

## 2025-01-22 ENCOUNTER — APPOINTMENT (OUTPATIENT)
Facility: CLINIC | Age: 70
End: 2025-01-22
Payer: MEDICARE

## 2025-01-22 VITALS — BODY MASS INDEX: 31.98 KG/M2 | HEIGHT: 66 IN | WEIGHT: 199 LBS

## 2025-01-22 DIAGNOSIS — T45.1X5A PAIN IN UNSPECIFIED JOINT: ICD-10-CM

## 2025-01-22 DIAGNOSIS — Z91.89 OTHER SPECIFIED PERSONAL RISK FACTORS, NOT ELSEWHERE CLASSIFIED: ICD-10-CM

## 2025-01-22 DIAGNOSIS — M79.2 NEURALGIA AND NEURITIS, UNSPECIFIED: ICD-10-CM

## 2025-01-22 DIAGNOSIS — M25.50 PAIN IN UNSPECIFIED JOINT: ICD-10-CM

## 2025-01-22 PROCEDURE — 93702 BIS XTRACELL FLUID ANALYSIS: CPT

## 2025-01-22 PROCEDURE — 99214 OFFICE O/P EST MOD 30 MIN: CPT | Mod: 25

## 2025-01-22 RX ORDER — DULOXETINE HYDROCHLORIDE 60 MG/1
60 CAPSULE, DELAYED RELEASE PELLETS ORAL
Qty: 30 | Refills: 3 | Status: ACTIVE | COMMUNITY
Start: 2025-01-22 | End: 1900-01-01

## 2025-04-23 ENCOUNTER — APPOINTMENT (OUTPATIENT)
Facility: CLINIC | Age: 70
End: 2025-04-23
Payer: MEDICARE

## 2025-04-23 VITALS
WEIGHT: 197 LBS | HEART RATE: 85 BPM | OXYGEN SATURATION: 99 % | BODY MASS INDEX: 31.66 KG/M2 | SYSTOLIC BLOOD PRESSURE: 130 MMHG | RESPIRATION RATE: 16 BRPM | HEIGHT: 66 IN | DIASTOLIC BLOOD PRESSURE: 80 MMHG

## 2025-04-23 DIAGNOSIS — M25.50 PAIN IN UNSPECIFIED JOINT: ICD-10-CM

## 2025-04-23 DIAGNOSIS — T45.1X5A PAIN IN UNSPECIFIED JOINT: ICD-10-CM

## 2025-04-23 DIAGNOSIS — Z91.89 OTHER SPECIFIED PERSONAL RISK FACTORS, NOT ELSEWHERE CLASSIFIED: ICD-10-CM

## 2025-04-23 DIAGNOSIS — M79.2 NEURALGIA AND NEURITIS, UNSPECIFIED: ICD-10-CM

## 2025-04-23 PROCEDURE — 93702 BIS XTRACELL FLUID ANALYSIS: CPT

## 2025-04-23 PROCEDURE — 99214 OFFICE O/P EST MOD 30 MIN: CPT | Mod: 25

## 2025-07-23 ENCOUNTER — NON-APPOINTMENT (OUTPATIENT)
Age: 70
End: 2025-07-23

## 2025-07-23 ENCOUNTER — APPOINTMENT (OUTPATIENT)
Facility: CLINIC | Age: 70
End: 2025-07-23
Payer: MEDICARE

## 2025-07-23 ENCOUNTER — APPOINTMENT (OUTPATIENT)
Facility: CLINIC | Age: 70
End: 2025-07-23

## 2025-07-23 VITALS
RESPIRATION RATE: 16 BRPM | BODY MASS INDEX: 30.53 KG/M2 | HEART RATE: 74 BPM | WEIGHT: 190 LBS | DIASTOLIC BLOOD PRESSURE: 80 MMHG | SYSTOLIC BLOOD PRESSURE: 110 MMHG | HEIGHT: 66 IN | OXYGEN SATURATION: 99 %

## 2025-07-23 DIAGNOSIS — Z17.0 MALIGNANT NEOPLASM OF UNSPECIFIED SITE OF LEFT FEMALE BREAST: ICD-10-CM

## 2025-07-23 DIAGNOSIS — C50.912 MALIGNANT NEOPLASM OF UNSPECIFIED SITE OF LEFT FEMALE BREAST: ICD-10-CM

## 2025-07-23 PROCEDURE — 93702 BIS XTRACELL FLUID ANALYSIS: CPT

## 2025-07-23 PROCEDURE — 99214 OFFICE O/P EST MOD 30 MIN: CPT | Mod: 25

## 2025-08-27 ENCOUNTER — APPOINTMENT (OUTPATIENT)
Facility: CLINIC | Age: 70
End: 2025-08-27
Payer: MEDICARE

## 2025-08-27 VITALS — WEIGHT: 190 LBS | HEIGHT: 66 IN | BODY MASS INDEX: 30.53 KG/M2

## 2025-08-27 DIAGNOSIS — T45.1X5A PAIN IN UNSPECIFIED JOINT: ICD-10-CM

## 2025-08-27 DIAGNOSIS — Z91.89 OTHER SPECIFIED PERSONAL RISK FACTORS, NOT ELSEWHERE CLASSIFIED: ICD-10-CM

## 2025-08-27 DIAGNOSIS — M79.2 NEURALGIA AND NEURITIS, UNSPECIFIED: ICD-10-CM

## 2025-08-27 DIAGNOSIS — M25.50 PAIN IN UNSPECIFIED JOINT: ICD-10-CM

## 2025-08-27 PROCEDURE — 99214 OFFICE O/P EST MOD 30 MIN: CPT
